# Patient Record
Sex: FEMALE | Race: WHITE | NOT HISPANIC OR LATINO | Employment: UNEMPLOYED | URBAN - METROPOLITAN AREA
[De-identification: names, ages, dates, MRNs, and addresses within clinical notes are randomized per-mention and may not be internally consistent; named-entity substitution may affect disease eponyms.]

---

## 2024-01-29 NOTE — PROGRESS NOTES
Chief Complaint  Chief Complaint   Patient presents with    Establish Care     Sees psychiatrist at [facility]     Nail Problem     Pt stated all 10 toenails need trimmed ; ref to podiatrist, prefers them to be numbed         Subjective          Chelsea Abdalla is here today to establish care. The following problems were discussed:     Family history: Mother- lung cancer, passed away/grandmother- heart disease/ Aunt- breast cancer/ maternal grandfather- alzheimer's     Social history: Smokes 1/2 PPD. Wants help to quit. Denies drinking. Smokes marijuana at night.     Anxiety with depression- She sees a therapist- Dr. Sparks and sees psych. She is currently on medications. Denies SI or HI.     Tobacco abuse- Smokes 1/2 PPD. Talk with her psych doctor about wellbutrin.     Toe infection- Both feet, all 10 toes. She reports this started 6 months ago. She does not get pedicares. She can't clip her toenails due to this.       She is from this area   Previous PCP was Dr. Joyce   Marital status- not   Children- Yes  Works as Unemployed   Exercise- irregularly  Diet- Eating well-balanced meals          Review of Systems   Constitutional:  Negative for chills and fever.   HENT:  Negative for congestion.    Eyes: Negative.    Respiratory:  Negative for shortness of breath.    Cardiovascular:  Negative for chest pain.   Gastrointestinal:  Negative for abdominal pain, nausea and vomiting.   Genitourinary:  Negative for difficulty urinating.   Musculoskeletal:  Negative for myalgias.   Skin:         Toe nail thickened, painful - bilateral feet    Neurological:  Negative for dizziness, light-headedness and headache.   Psychiatric/Behavioral:  Positive for depressed mood. Negative for self-injury and suicidal ideas. The patient is nervous/anxious.         Objective   Vital Signs:   Vitals:    01/31/24 0955   BP: 108/77   Pulse: 100   Temp: 98.2 °F (36.8 °C)   SpO2: 99%      Estimated body mass index is 20.5 kg/m² as  "calculated from the following:    Height as of this encounter: 167.6 cm (66\").    Weight as of this encounter: 57.6 kg (127 lb).    BMI is within normal parameters. No other follow-up for BMI required.            Patient screened positive for depression based on a PHQ-9 score of  10 . Follow-up recommendations include: Sees psychiatrist         Physical Exam  Vitals reviewed.   Constitutional:       Appearance: Normal appearance.   HENT:      Head: Normocephalic and atraumatic.      Nose: Nose normal.      Mouth/Throat:      Mouth: Mucous membranes are moist.      Pharynx: Oropharynx is clear.   Eyes:      Extraocular Movements: Extraocular movements intact.      Pupils: Pupils are equal, round, and reactive to light.   Cardiovascular:      Rate and Rhythm: Normal rate and regular rhythm.      Pulses: Normal pulses.      Heart sounds: Normal heart sounds.      Comments: S1, S2 audible  Pulmonary:      Effort: Pulmonary effort is normal.      Breath sounds: Normal breath sounds.      Comments: On room air   Abdominal:      General: Abdomen is flat.      Palpations: Abdomen is soft.   Musculoskeletal:         General: Normal range of motion.      Cervical back: Normal range of motion.   Feet:      Right foot:      Skin integrity: Skin integrity normal.      Toenail Condition: Right toenails are abnormally thick and long. Fungal disease present.     Left foot:      Skin integrity: Skin integrity normal.      Toenail Condition: Left toenails are abnormally thick and long. Fungal disease present.  Skin:     General: Skin is warm and dry.   Neurological:      General: No focal deficit present.      Mental Status: She is alert and oriented to person, place, and time. Mental status is at baseline.   Psychiatric:         Mood and Affect: Mood normal.         Behavior: Behavior normal.         Thought Content: Thought content normal.         Judgment: Judgment normal.                Physical Exam   Result Review :           "   Procedures       Assessment and Plan     Diagnoses and all orders for this visit:    1. Fungal nail infection (Primary)  Assessment & Plan:  Toe infection- Both feet, all 10 toes. She reports this started 6 months ago. She does not get pedicares. She can't clip her toenails due to this.     Fungal infection noted bilateral toes on all toes     Referral to podiatry     Orders:  -     Ambulatory Referral to Podiatry    2. Encounter to establish care  Assessment & Plan:  She is from this area   Previous PCP was Dr. Joyce   Marital status- not   Children- Yes  Works as Unemployed   Exercise- irregularly  Diet- Eating well-balanced meals     Family history: Mother- lung cancer, passed away/grandmother- heart disease/ Aunt- breast cancer/ maternal grandfather- alzheimer's     Social history: Smokes 1/2 PPD. Wants help to quit. Denies drinking. Smokes marijuana at night.       3. Tobacco abuse  Assessment & Plan:  Tobacco abuse- Smokes 1/2 PPD. Talk with her psych doctor about wellbutrin.     Chelsea BERMEO Rm  reports that she has been smoking cigarettes. She has a 5.00 pack-year smoking history. She has never been exposed to tobacco smoke. She has never used smokeless tobacco.. I have educated her on the risk of diseases from using tobacco products such as cancer, COPD, and heart disease.     I advised her to quit and she is willing to quit. We have discussed the following method/s for tobacco cessation:  Education Material.  Together we have set a quit date for 3 weeks from today.  She will follow up with me in 3 week or sooner to check on her progress.    I spent 3  minutes counseling the patient.            4. Anxiety with depression  Assessment & Plan:  Anxiety with depression- She sees a therapist- Dr. Sparks and sees psych. She is currently on medications. Denies SI or HI.     On remeron and fanapt                 Follow Up   Return in about 3 months (around 4/30/2024) for Annual physical.   Patient  was given instructions and counseling regarding her condition or for health maintenance advice. Please see specific information pulled into the AVS if appropriate.

## 2024-01-30 RX ORDER — MIRTAZAPINE 45 MG/1
TABLET, FILM COATED ORAL
COMMUNITY
Start: 2022-09-06

## 2024-01-30 RX ORDER — ILOPERIDONE 1-2-4-6MG
KIT ORAL
COMMUNITY
Start: 2022-09-06

## 2024-01-31 ENCOUNTER — OFFICE VISIT (OUTPATIENT)
Dept: FAMILY MEDICINE CLINIC | Facility: CLINIC | Age: 44
End: 2024-01-31
Payer: MEDICAID

## 2024-01-31 VITALS
WEIGHT: 127 LBS | DIASTOLIC BLOOD PRESSURE: 77 MMHG | HEART RATE: 100 BPM | TEMPERATURE: 98.2 F | SYSTOLIC BLOOD PRESSURE: 108 MMHG | HEIGHT: 66 IN | OXYGEN SATURATION: 99 % | BODY MASS INDEX: 20.41 KG/M2

## 2024-01-31 DIAGNOSIS — Z76.89 ENCOUNTER TO ESTABLISH CARE: ICD-10-CM

## 2024-01-31 DIAGNOSIS — B35.1 FUNGAL NAIL INFECTION: Primary | ICD-10-CM

## 2024-01-31 DIAGNOSIS — F41.8 ANXIETY WITH DEPRESSION: ICD-10-CM

## 2024-01-31 DIAGNOSIS — Z72.0 TOBACCO ABUSE: ICD-10-CM

## 2024-01-31 PROCEDURE — 99204 OFFICE O/P NEW MOD 45 MIN: CPT | Performed by: NURSE PRACTITIONER

## 2024-01-31 PROCEDURE — 1159F MED LIST DOCD IN RCRD: CPT | Performed by: NURSE PRACTITIONER

## 2024-01-31 PROCEDURE — 1160F RVW MEDS BY RX/DR IN RCRD: CPT | Performed by: NURSE PRACTITIONER

## 2024-01-31 NOTE — ASSESSMENT & PLAN NOTE
Anxiety with depression- She sees a therapist- Dr. Sparks and sees psych. She is currently on medications. Denies SI or HI.     On remeron and fanapt

## 2024-01-31 NOTE — ASSESSMENT & PLAN NOTE
She is from this area   Previous PCP was Dr. Joyce   Marital status- not   Children- Yes  Works as Unemployed   Exercise- irregularly  Diet- Eating well-balanced meals     Family history: Mother- lung cancer, passed away/grandmother- heart disease/ Aunt- breast cancer/ maternal grandfather- alzheimer's     Social history: Smokes 1/2 PPD. Wants help to quit. Denies drinking. Smokes marijuana at night.

## 2024-01-31 NOTE — ASSESSMENT & PLAN NOTE
Tobacco abuse- Smokes 1/2 PPD. Talk with her psych doctor about wellbutrin.     Chelsea Abdalla  reports that she has been smoking cigarettes. She has a 5.00 pack-year smoking history. She has never been exposed to tobacco smoke. She has never used smokeless tobacco.. I have educated her on the risk of diseases from using tobacco products such as cancer, COPD, and heart disease.     I advised her to quit and she is willing to quit. We have discussed the following method/s for tobacco cessation:  Education Material.  Together we have set a quit date for 3 weeks from today.  She will follow up with me in 3 week or sooner to check on her progress.    I spent 3  minutes counseling the patient.

## 2024-02-02 ENCOUNTER — PATIENT ROUNDING (BHMG ONLY) (OUTPATIENT)
Dept: FAMILY MEDICINE CLINIC | Facility: CLINIC | Age: 44
End: 2024-02-02
Payer: MEDICAID

## 2024-02-09 ENCOUNTER — TELEPHONE (OUTPATIENT)
Dept: FAMILY MEDICINE CLINIC | Facility: CLINIC | Age: 44
End: 2024-02-09

## 2024-02-09 DIAGNOSIS — B35.1 FUNGAL NAIL INFECTION: Primary | ICD-10-CM

## 2024-02-09 NOTE — TELEPHONE ENCOUNTER
Caller: Chelsea Abdalla    Relationship: Self    Best call back number: [phone]    What is the medical concern/diagnosis: PODIATRIST    What specialty or service is being requested: PODIATRIST    What is the provider, practice or medical service name: ANY     What is the office location:     What is the office phone number:     Any additional details: DR HEADLEY CANNOT GET AN APPT TIL 6/24

## 2024-02-12 DIAGNOSIS — B35.1 FUNGAL NAIL INFECTION: Primary | ICD-10-CM

## 2024-02-12 NOTE — TELEPHONE ENCOUNTER
Caller: Chelsea Abdalla    Relationship: Self    Best call back number: [phone]    What orders are you requesting (i.e. lab or imaging): PODIATRIST    In what timeframe would the patient need to come in: AS SOON AS POSSIBLE    Where will you receive your lab/imaging services: WHOEVER TAKES HER INSURANCE AND CAN GET HER IN THIS WEEK IF POSSIBLE    Additional notes: PATIENT STATED DR HEADLEY CANCELLED HER APPT AND CAN'T GET HER IN UNTIL JUNE.    DR NORWOOD OFFICE DOES NOT ACCEPT HER INSURANCE.    PATIENT REQUESTING TO GET INTO SEE A DR AS SOON AS POSSIBLE.

## 2024-04-26 RX ORDER — BUPROPION HYDROCHLORIDE 150 MG/1
150 TABLET, EXTENDED RELEASE ORAL DAILY
COMMUNITY
Start: 2024-04-03

## 2024-04-26 NOTE — PROGRESS NOTES
"Chief Complaint  Chief Complaint   Patient presents with    Annual Exam        Subjective          Chelsea Abdalla is a 44-year-old female who reports to the office today for annual physical.    Annual physical- Denies any new family history. Smokes 1/2 PPD. She is on wellbutrin. Denies drinking, Smokes marijuana nightly.     Anxiety with depression- She reports she has been on edge lately. She is on wellbutrin and remeron. She sees her counselor soon. Denies SI or HI.     Tobacco abuse-Smokes 1/2 PPD. She is on wellbutrin.     Fungal infection- She went to podiatry and they cut both her toenails off and gave her antibiotics.       Labs-Due  Mammogram- Due   Papsmear-Due      Vaccines:  UTD     Dental exam- Due  Eye exam-Due          Review of Systems   Constitutional:  Negative for chills and fever.   HENT:  Negative for congestion.    Eyes: Negative.    Respiratory:  Negative for shortness of breath.    Cardiovascular:  Negative for chest pain.   Gastrointestinal:  Negative for abdominal pain, nausea and vomiting.   Endocrine: Negative for polydipsia and polyuria.   Genitourinary:  Negative for difficulty urinating.   Musculoskeletal:  Negative for myalgias.   Skin: Negative.    Neurological:  Negative for dizziness, light-headedness and headache.   Psychiatric/Behavioral:  Positive for depressed mood. Negative for self-injury and suicidal ideas. The patient is nervous/anxious.         Objective   Vital Signs:   Vitals:    04/29/24 1457   BP: 110/77   Pulse: 115   Temp: 98.4 °F (36.9 °C)   SpO2: 100%      Estimated body mass index is 20.99 kg/m² as calculated from the following:    Height as of this encounter: 167.6 cm (65.98\").    Weight as of this encounter: 59 kg (130 lb).    BMI is within normal parameters. No other follow-up for BMI required.            Patient screened positive for depression based on a PHQ-9 score of 10 on 1/31/2024. Follow-up recommendations include: PCP managing " depression.        Physical Exam  Vitals reviewed.   Constitutional:       Appearance: Normal appearance. She is normal weight.   HENT:      Head: Normocephalic and atraumatic.      Right Ear: Tympanic membrane, ear canal and external ear normal.      Left Ear: Tympanic membrane, ear canal and external ear normal.      Nose: Nose normal.      Mouth/Throat:      Mouth: Mucous membranes are moist.      Pharynx: Oropharynx is clear.   Eyes:      Extraocular Movements: Extraocular movements intact.      Conjunctiva/sclera: Conjunctivae normal.      Pupils: Pupils are equal, round, and reactive to light.   Cardiovascular:      Rate and Rhythm: Normal rate and regular rhythm.      Pulses: Normal pulses.      Heart sounds: Normal heart sounds.      Comments: S1, S2 audible  Pulmonary:      Effort: Pulmonary effort is normal.      Breath sounds: Normal breath sounds.      Comments: On room air   Abdominal:      General: Abdomen is flat. Bowel sounds are normal.      Palpations: Abdomen is soft.   Musculoskeletal:         General: Normal range of motion.      Cervical back: Normal range of motion and neck supple.   Feet:      Right foot:      Toenail Condition: Right toenails are abnormally thick. Fungal disease present.     Left foot:      Toenail Condition: Left toenails are abnormally thick. Fungal disease present.  Skin:     General: Skin is warm and dry.      Comments: Scars noted on face, tan skin noted   Neurological:      General: No focal deficit present.      Mental Status: She is alert and oriented to person, place, and time. Mental status is at baseline.   Psychiatric:         Mood and Affect: Mood normal.         Behavior: Behavior normal.         Thought Content: Thought content normal.         Judgment: Judgment normal.                Physical Exam   Result Review :             Procedures       Assessment and Plan     Diagnoses and all orders for this visit:    1. Encounter for annual physical exam  (Primary)  Assessment & Plan:  Annual physical- Denies any new family history. Smokes 1/2 PPD. She is on wellbutrin. Denies drinking, Smokes marijuana nightly.     Labs-Due  Mammogram- Due   Papsmear-Due      Vaccines:  UTD     Dental exam- Due  Eye exam-Due     Encourage healthy diet and exercise  Encourage monthly self breast exams   Check labs   Mammogram ordered   Encouraged to get papsmear     Orders:  -     Comprehensive Metabolic Panel; Future  -     Hemoglobin A1c; Future  -     Lipid Panel; Future  -     Hepatitis C Antibody; Future    2. Anxiety with depression  Assessment & Plan:  On remeron and wellbutrin    Anxiety with depression- She reports she has been on edge lately. She is on wellbutrin and remeron. She sees her counselor soon. Denies SI or HI.       3. Tobacco abuse  Assessment & Plan:  Tobacco abuse-Smokes 1/2 PPD. She is on wellbutrin.     Chelsea Abdalla  reports that she has been smoking cigarettes. She started smoking about 14 years ago. She has a 12 pack-year smoking history. She has never been exposed to tobacco smoke. She has never used smokeless tobacco. I have educated her on the risk of diseases from using tobacco products such as cancer, COPD, and heart disease.     I advised her to quit and she is willing to quit. We have discussed the following method/s for tobacco cessation:  Prescription Medicaiton.  Together we have set a quit date for 3 weeks from today.  She will follow up with me in 3 week or sooner to check on her progress.    I spent 3  minutes counseling the patient.            4. Fungal nail infection  Assessment & Plan:  Fungal infection- She went to podiatry and they cut both her toenails off and gave her antibiotics.     On lamisil       5. Encounter for screening mammogram for malignant neoplasm of breast  -     Mammo Screening Digital Tomosynthesis Bilateral With CAD; Future              Follow Up   Return in about 1 year (around 4/29/2025) for Annual physical.    Patient was given instructions and counseling regarding her condition or for health maintenance advice. Please see specific information pulled into the AVS if appropriate.

## 2024-04-29 ENCOUNTER — OFFICE VISIT (OUTPATIENT)
Dept: FAMILY MEDICINE CLINIC | Facility: CLINIC | Age: 44
End: 2024-04-29
Payer: MEDICAID

## 2024-04-29 VITALS
BODY MASS INDEX: 20.89 KG/M2 | HEART RATE: 115 BPM | WEIGHT: 130 LBS | OXYGEN SATURATION: 100 % | SYSTOLIC BLOOD PRESSURE: 110 MMHG | DIASTOLIC BLOOD PRESSURE: 77 MMHG | TEMPERATURE: 98.4 F | HEIGHT: 66 IN

## 2024-04-29 DIAGNOSIS — F41.8 ANXIETY WITH DEPRESSION: ICD-10-CM

## 2024-04-29 DIAGNOSIS — B35.1 FUNGAL NAIL INFECTION: ICD-10-CM

## 2024-04-29 DIAGNOSIS — Z72.0 TOBACCO ABUSE: ICD-10-CM

## 2024-04-29 DIAGNOSIS — Z12.31 ENCOUNTER FOR SCREENING MAMMOGRAM FOR MALIGNANT NEOPLASM OF BREAST: ICD-10-CM

## 2024-04-29 DIAGNOSIS — Z00.00 ENCOUNTER FOR ANNUAL PHYSICAL EXAM: Primary | ICD-10-CM

## 2024-04-29 PROBLEM — Z76.89 ENCOUNTER TO ESTABLISH CARE: Status: RESOLVED | Noted: 2024-01-31 | Resolved: 2024-04-29

## 2024-04-29 PROCEDURE — 2014F MENTAL STATUS ASSESS: CPT | Performed by: NURSE PRACTITIONER

## 2024-04-29 PROCEDURE — 1159F MED LIST DOCD IN RCRD: CPT | Performed by: NURSE PRACTITIONER

## 2024-04-29 PROCEDURE — 99396 PREV VISIT EST AGE 40-64: CPT | Performed by: NURSE PRACTITIONER

## 2024-04-29 PROCEDURE — 1160F RVW MEDS BY RX/DR IN RCRD: CPT | Performed by: NURSE PRACTITIONER

## 2024-04-29 PROCEDURE — 99406 BEHAV CHNG SMOKING 3-10 MIN: CPT | Performed by: NURSE PRACTITIONER

## 2024-04-29 RX ORDER — TERBINAFINE HYDROCHLORIDE 250 MG/1
250 TABLET ORAL DAILY
COMMUNITY

## 2024-04-29 NOTE — ASSESSMENT & PLAN NOTE
On remeron and wellbutrin    Anxiety with depression- She reports she has been on edge lately. She is on wellbutrin and remeron. She sees her counselor soon. Denies SI or HI.

## 2024-04-29 NOTE — ASSESSMENT & PLAN NOTE
Fungal infection- She went to podiatry and they cut both her toenails off and gave her antibiotics.     On lamisil

## 2024-04-29 NOTE — PATIENT INSTRUCTIONS
Encourage healthy diet and exercise  Encourage monthly self breast exams   Check labs   Mammogram ordered   Encouraged to get papsmear

## 2024-04-29 NOTE — ASSESSMENT & PLAN NOTE
Annual physical- Denies any new family history. Smokes 1/2 PPD. She is on wellbutrin. Denies drinking, Smokes marijuana nightly.     Labs-Due  Mammogram- Due   Papsmear-Due      Vaccines:  UTD     Dental exam- Due  Eye exam-Due     Encourage healthy diet and exercise  Encourage monthly self breast exams   Check labs   Mammogram ordered   Encouraged to get papsmear

## 2024-05-06 ENCOUNTER — CLINICAL SUPPORT (OUTPATIENT)
Dept: FAMILY MEDICINE CLINIC | Facility: CLINIC | Age: 44
End: 2024-05-06
Payer: MEDICAID

## 2024-05-06 DIAGNOSIS — Z00.00 ENCOUNTER FOR ANNUAL PHYSICAL EXAM: ICD-10-CM

## 2024-05-06 PROCEDURE — 86803 HEPATITIS C AB TEST: CPT | Performed by: NURSE PRACTITIONER

## 2024-05-06 PROCEDURE — 80053 COMPREHEN METABOLIC PANEL: CPT | Performed by: NURSE PRACTITIONER

## 2024-05-06 PROCEDURE — 80061 LIPID PANEL: CPT | Performed by: NURSE PRACTITIONER

## 2024-05-06 PROCEDURE — 83036 HEMOGLOBIN GLYCOSYLATED A1C: CPT | Performed by: NURSE PRACTITIONER

## 2024-05-06 PROCEDURE — 36415 COLL VENOUS BLD VENIPUNCTURE: CPT | Performed by: NURSE PRACTITIONER

## 2024-05-07 LAB
ALBUMIN SERPL-MCNC: 4.2 G/DL (ref 3.5–5.2)
ALBUMIN/GLOB SERPL: 1.4 G/DL
ALP SERPL-CCNC: 74 U/L (ref 39–117)
ALT SERPL W P-5'-P-CCNC: 14 U/L (ref 1–33)
ANION GAP SERPL CALCULATED.3IONS-SCNC: 12.3 MMOL/L (ref 5–15)
AST SERPL-CCNC: 17 U/L (ref 1–32)
BILIRUB SERPL-MCNC: <0.2 MG/DL (ref 0–1.2)
BUN SERPL-MCNC: 9 MG/DL (ref 6–20)
BUN/CREAT SERPL: 10.1 (ref 7–25)
CALCIUM SPEC-SCNC: 9.5 MG/DL (ref 8.6–10.5)
CHLORIDE SERPL-SCNC: 108 MMOL/L (ref 98–107)
CHOLEST SERPL-MCNC: 178 MG/DL (ref 0–200)
CO2 SERPL-SCNC: 20.7 MMOL/L (ref 22–29)
CREAT SERPL-MCNC: 0.89 MG/DL (ref 0.57–1)
EGFRCR SERPLBLD CKD-EPI 2021: 82.6 ML/MIN/1.73
GLOBULIN UR ELPH-MCNC: 2.9 GM/DL
GLUCOSE SERPL-MCNC: 90 MG/DL (ref 65–99)
HBA1C MFR BLD: 5.2 % (ref 4.8–5.6)
HCV AB SER QL: NORMAL
HDLC SERPL-MCNC: 38 MG/DL (ref 40–60)
LDLC SERPL CALC-MCNC: 125 MG/DL (ref 0–100)
LDLC/HDLC SERPL: 3.26 {RATIO}
POTASSIUM SERPL-SCNC: 4.7 MMOL/L (ref 3.5–5.2)
PROT SERPL-MCNC: 7.1 G/DL (ref 6–8.5)
SODIUM SERPL-SCNC: 141 MMOL/L (ref 136–145)
TRIGL SERPL-MCNC: 80 MG/DL (ref 0–150)
VLDLC SERPL-MCNC: 15 MG/DL (ref 5–40)

## 2024-05-15 ENCOUNTER — HOSPITAL ENCOUNTER (OUTPATIENT)
Dept: MAMMOGRAPHY | Facility: HOSPITAL | Age: 44
Discharge: HOME | End: 2024-05-15
Admitting: NURSE PRACTITIONER
Payer: MEDICAID

## 2024-05-15 ENCOUNTER — TELEPHONE (OUTPATIENT)
Dept: FAMILY MEDICINE CLINIC | Facility: CLINIC | Age: 44
End: 2024-05-15
Payer: MEDICAID

## 2024-05-15 DIAGNOSIS — R92.8 ABNORMAL MAMMOGRAM OF BOTH BREASTS: Primary | ICD-10-CM

## 2024-05-15 DIAGNOSIS — Z12.31 ENCOUNTER FOR SCREENING MAMMOGRAM FOR MALIGNANT NEOPLASM OF BREAST: ICD-10-CM

## 2024-05-15 DIAGNOSIS — Z80.3 FAMILY HISTORY OF BREAST CANCER: ICD-10-CM

## 2024-05-15 PROCEDURE — 77063 BREAST TOMOSYNTHESIS BI: CPT

## 2024-05-15 PROCEDURE — 77067 SCR MAMMO BI INCL CAD: CPT

## 2024-05-15 NOTE — TELEPHONE ENCOUNTER
I called patient on 5/15/2024 in regards to her mammogram of her breast.  It did show she has breast dense tissue and some masses bilateral breast that could be cyst or lymph nodes.  It is recommended to do a risk analysis.  Patient does have a family history of breast cancer.  MRI of bilateral breast were ordered.    Electronically signed by RUPA Butcher, 05/15/24, 10:16 AM EDT.

## 2024-06-05 ENCOUNTER — HOSPITAL ENCOUNTER (OUTPATIENT)
Dept: MRI IMAGING | Facility: HOSPITAL | Age: 44
Discharge: HOME | End: 2024-06-05
Payer: MEDICAID

## 2024-06-05 DIAGNOSIS — R92.8 ABNORMAL MAMMOGRAM OF BOTH BREASTS: ICD-10-CM

## 2024-06-05 DIAGNOSIS — Z80.3 FAMILY HISTORY OF BREAST CANCER: ICD-10-CM

## 2024-06-05 LAB
CREAT BLDA-MCNC: 0.8 MG/DL (ref 0.6–1.3)
EGFRCR SERPLBLD CKD-EPI 2021: 93.9 ML/MIN/1.73

## 2024-06-05 PROCEDURE — 25010000002 GADOTERIDOL PER 1 ML: Performed by: NURSE PRACTITIONER

## 2024-06-05 PROCEDURE — 77049 MRI BREAST C-+ W/CAD BI: CPT

## 2024-06-05 PROCEDURE — 82565 ASSAY OF CREATININE: CPT

## 2024-06-05 PROCEDURE — A9579 GAD-BASE MR CONTRAST NOS,1ML: HCPCS | Performed by: NURSE PRACTITIONER

## 2024-06-05 RX ADMIN — GADOTERIDOL 12 ML: 279.3 INJECTION, SOLUTION INTRAVENOUS at 14:08

## 2024-06-07 ENCOUNTER — TELEPHONE (OUTPATIENT)
Dept: FAMILY MEDICINE CLINIC | Facility: CLINIC | Age: 44
End: 2024-06-07
Payer: MEDICAID

## 2024-06-07 DIAGNOSIS — R92.8 ABNORMAL MAMMOGRAM OF BOTH BREASTS: Primary | ICD-10-CM

## 2024-06-07 NOTE — TELEPHONE ENCOUNTER
So MRI now, and repeat in 6 mon? Just wanted to verify as there are 2 separate orders for same MRI.

## 2024-06-07 NOTE — TELEPHONE ENCOUNTER
I called patient on 6/7/2024 in regards to her diagnostic mammogram.  Patient understood the results.  I ordered an MRI to be done in 6 months to follow-up on this.    Electronically signed by RUPA Butcher, 06/07/24, 10:22 AM EDT.

## 2024-12-16 ENCOUNTER — HOSPITAL ENCOUNTER (OUTPATIENT)
Dept: MRI IMAGING | Facility: HOSPITAL | Age: 44
Discharge: HOME | End: 2024-12-16
Admitting: NURSE PRACTITIONER
Payer: MEDICAID

## 2024-12-16 DIAGNOSIS — R92.8 ABNORMAL MAMMOGRAM OF BOTH BREASTS: ICD-10-CM

## 2024-12-16 PROCEDURE — 77049 MRI BREAST C-+ W/CAD BI: CPT

## 2024-12-16 PROCEDURE — A9579 GAD-BASE MR CONTRAST NOS,1ML: HCPCS | Performed by: NURSE PRACTITIONER

## 2024-12-16 PROCEDURE — 25010000002 GADOTERIDOL PER 1 ML: Performed by: NURSE PRACTITIONER

## 2024-12-16 RX ADMIN — GADOTERIDOL 12 ML: 279.3 INJECTION, SOLUTION INTRAVENOUS at 10:42

## 2025-04-28 NOTE — PROGRESS NOTES
"Chief Complaint  Annual physical        Subjective          Chelsea Abdalla is a 45-year-old female who reports to the office today for annual physical.     Annual physical- Denies any new family history. Smokes 1/2 PPD, does not want help to quit. Denies drinking, Smokes marijuana nightly.      Anxiety with depression- She reports this has been good. She is sees a therapist. Denies SI or HI.         Labs-Due  Mammogram-UTD  Papsmear-UTD       Vaccines:  PNA-Refused       Dental exam-Has dentures   Eye exam-UTD          Review of Systems   Constitutional:  Negative for chills and fatigue.   HENT:  Negative for congestion.    Eyes: Negative.    Respiratory:  Negative for shortness of breath.    Cardiovascular:  Negative for chest pain.   Gastrointestinal:  Negative for abdominal pain, nausea and vomiting.   Genitourinary:  Positive for pelvic pain. Negative for difficulty urinating.   Musculoskeletal:  Negative for myalgias.   Skin: Negative.    Neurological:  Negative for dizziness, light-headedness and headache.   Psychiatric/Behavioral:  Negative for self-injury, suicidal ideas and depressed mood. The patient is not nervous/anxious.         Objective   Vital Signs:   Vitals:    05/02/25 0925   BP: 114/75   Pulse: 89   Temp: 97.8 °F (36.6 °C)   SpO2: 98%      Estimated body mass index is 21.53 kg/m² as calculated from the following:    Height as of this encounter: 167.6 cm (66\").    Weight as of this encounter: 60.5 kg (133 lb 6.4 oz).    BMI is within normal parameters. No other follow-up for BMI required.            Patient screened negative for depression based on a PHQ-9 score of 0 on 5/2/2025 . Follow-up recommendations include: PCP managing depression.        Physical Exam  Vitals reviewed.   Constitutional:       Appearance: Normal appearance. She is normal weight.   HENT:      Head: Normocephalic and atraumatic.      Right Ear: Tympanic membrane, ear canal and external ear normal.      Left Ear: Tympanic " membrane, ear canal and external ear normal.      Nose: Nose normal.      Mouth/Throat:      Mouth: Mucous membranes are moist.      Pharynx: Oropharynx is clear.   Eyes:      Extraocular Movements: Extraocular movements intact.      Conjunctiva/sclera: Conjunctivae normal.      Pupils: Pupils are equal, round, and reactive to light.   Cardiovascular:      Rate and Rhythm: Normal rate and regular rhythm.      Pulses: Normal pulses.      Heart sounds: Normal heart sounds.      Comments: S1, S2 audible  Pulmonary:      Effort: Pulmonary effort is normal.      Breath sounds: Normal breath sounds.      Comments: On room air   Abdominal:      General: Abdomen is flat. Bowel sounds are normal.      Palpations: Abdomen is soft.   Musculoskeletal:         General: Normal range of motion.      Cervical back: Normal range of motion and neck supple.   Skin:     General: Skin is warm and dry.   Neurological:      General: No focal deficit present.      Mental Status: She is alert and oriented to person, place, and time. Mental status is at baseline.   Psychiatric:         Mood and Affect: Mood normal.         Behavior: Behavior normal.         Thought Content: Thought content normal.         Judgment: Judgment normal.                Physical Exam   Result Review :             Procedures       Assessment and Plan     Diagnoses and all orders for this visit:    1. Encounter for annual physical exam (Primary)  Assessment & Plan:  Annual physical- Denies any new family history. Smokes 1/2 PPD, does not want help to quit. Denies drinking, Smokes marijuana nightly.     Labs-Due  Mammogram-UTD  Papsmear-UTD       Vaccines:  PNA-Refused       Dental exam-Has dentures   Eye exam-UTD     Encourage healthy diet and exercise  Encouraged monthly self breast exams  Check labs  Encourage papsmear   Encourage smoking cessation     Orders:  -     Comprehensive Metabolic Panel; Future  -     Hemoglobin A1c; Future  -     Lipid Panel;  Future    2. Anxiety with depression  Assessment & Plan:    Anxiety with depression- She reports this has been good. She is sees a therapist. Denies SI or HI.     On fanapt and remeron      3. Tobacco abuse  Assessment & Plan:  Chelsea Abdalla  reports that she has been smoking cigarettes. She started smoking about 15 years ago. She has a 7.5 pack-year smoking history. She has never been exposed to tobacco smoke. She has never used smokeless tobacco. I have educated her on the risk of diseases from using tobacco products such as cancer, COPD, and heart disease.     I advised her to quit and she is not willing to quit.    I spent 3  minutes counseling the patient.                      Follow Up   Return in about 1 year (around 5/2/2026) for Annual physical.   Patient was given instructions and counseling regarding her condition or for health maintenance advice. Please see specific information pulled into the AVS if appropriate.

## 2025-04-30 NOTE — ASSESSMENT & PLAN NOTE
Anxiety with depression- She reports this has been good. She is sees a therapist. Denies SI or HI.     On fanapt and remeron

## 2025-04-30 NOTE — ASSESSMENT & PLAN NOTE
Annual physical- Denies any new family history. Smokes 1/2 PPD, does not want help to quit. Denies drinking, Smokes marijuana nightly.     Labs-Due  Mammogram-UTD  Papsmear-UTD       Vaccines:  PNA-Refused       Dental exam-Has dentures   Eye exam-UTD     Encourage healthy diet and exercise  Encouraged monthly self breast exams  Check labs  Encourage papsmear   Encourage smoking cessation

## 2025-05-02 ENCOUNTER — OFFICE VISIT (OUTPATIENT)
Dept: FAMILY MEDICINE CLINIC | Facility: CLINIC | Age: 45
End: 2025-05-02
Payer: MEDICAID

## 2025-05-02 VITALS
SYSTOLIC BLOOD PRESSURE: 114 MMHG | HEART RATE: 89 BPM | WEIGHT: 133.4 LBS | BODY MASS INDEX: 21.44 KG/M2 | TEMPERATURE: 97.8 F | HEIGHT: 66 IN | OXYGEN SATURATION: 98 % | DIASTOLIC BLOOD PRESSURE: 75 MMHG

## 2025-05-02 DIAGNOSIS — F41.8 ANXIETY WITH DEPRESSION: ICD-10-CM

## 2025-05-02 DIAGNOSIS — Z00.00 ENCOUNTER FOR ANNUAL PHYSICAL EXAM: Primary | ICD-10-CM

## 2025-05-02 DIAGNOSIS — Z72.0 TOBACCO ABUSE: ICD-10-CM

## 2025-05-02 PROBLEM — B35.1 FUNGAL NAIL INFECTION: Status: RESOLVED | Noted: 2024-01-31 | Resolved: 2025-05-02

## 2025-05-02 RX ORDER — ILOPERIDONE 6 MG/1
TABLET ORAL
COMMUNITY
Start: 2025-04-15

## 2025-05-02 NOTE — ASSESSMENT & PLAN NOTE
Chelsea BERMEO Rm  reports that she has been smoking cigarettes. She started smoking about 15 years ago. She has a 7.5 pack-year smoking history. She has never been exposed to tobacco smoke. She has never used smokeless tobacco. I have educated her on the risk of diseases from using tobacco products such as cancer, COPD, and heart disease.     I advised her to quit and she is not willing to quit.    I spent 3  minutes counseling the patient.

## 2025-05-02 NOTE — PATIENT INSTRUCTIONS
Encourage healthy diet and exercise  Encouraged monthly self breast exams  Check labs  Encourage papsmear   Encourage smoking cessation

## 2025-05-12 ENCOUNTER — CLINICAL SUPPORT (OUTPATIENT)
Dept: FAMILY MEDICINE CLINIC | Facility: CLINIC | Age: 45
End: 2025-05-12
Payer: MEDICAID

## 2025-05-12 DIAGNOSIS — Z00.00 ENCOUNTER FOR ANNUAL PHYSICAL EXAM: ICD-10-CM

## 2025-05-12 PROCEDURE — 83036 HEMOGLOBIN GLYCOSYLATED A1C: CPT | Performed by: NURSE PRACTITIONER

## 2025-05-12 PROCEDURE — 80061 LIPID PANEL: CPT | Performed by: NURSE PRACTITIONER

## 2025-05-12 PROCEDURE — 80053 COMPREHEN METABOLIC PANEL: CPT | Performed by: NURSE PRACTITIONER

## 2025-05-12 PROCEDURE — 36415 COLL VENOUS BLD VENIPUNCTURE: CPT | Performed by: NURSE PRACTITIONER

## 2025-05-12 NOTE — PROGRESS NOTES
Venipuncture performed in L ARM by RT Lo  with good hemostasis. Patient tolerated well. 05/12/25 Evelyn Sneed, RT

## 2025-05-13 LAB
ALBUMIN SERPL-MCNC: 4.3 G/DL (ref 3.5–5.2)
ALBUMIN/GLOB SERPL: 1.3 G/DL
ALP SERPL-CCNC: 78 U/L (ref 39–117)
ALT SERPL W P-5'-P-CCNC: 10 U/L (ref 1–33)
ANION GAP SERPL CALCULATED.3IONS-SCNC: 9.3 MMOL/L (ref 5–15)
AST SERPL-CCNC: 18 U/L (ref 1–32)
BILIRUB SERPL-MCNC: 0.3 MG/DL (ref 0–1.2)
BUN SERPL-MCNC: 9 MG/DL (ref 6–20)
BUN/CREAT SERPL: 10 (ref 7–25)
CALCIUM SPEC-SCNC: 9.3 MG/DL (ref 8.6–10.5)
CHLORIDE SERPL-SCNC: 104 MMOL/L (ref 98–107)
CHOLEST SERPL-MCNC: 181 MG/DL (ref 0–200)
CO2 SERPL-SCNC: 23.7 MMOL/L (ref 22–29)
CREAT SERPL-MCNC: 0.9 MG/DL (ref 0.57–1)
EGFRCR SERPLBLD CKD-EPI 2021: 81 ML/MIN/1.73
GLOBULIN UR ELPH-MCNC: 3.3 GM/DL
GLUCOSE SERPL-MCNC: 114 MG/DL (ref 65–99)
HBA1C MFR BLD: 5 % (ref 4.8–5.6)
HDLC SERPL-MCNC: 32 MG/DL (ref 40–60)
LDLC SERPL CALC-MCNC: 128 MG/DL (ref 0–100)
LDLC/HDLC SERPL: 3.93 {RATIO}
POTASSIUM SERPL-SCNC: 4.1 MMOL/L (ref 3.5–5.2)
PROT SERPL-MCNC: 7.6 G/DL (ref 6–8.5)
SODIUM SERPL-SCNC: 137 MMOL/L (ref 136–145)
TRIGL SERPL-MCNC: 116 MG/DL (ref 0–150)
VLDLC SERPL-MCNC: 21 MG/DL (ref 5–40)

## 2025-06-04 ENCOUNTER — OFFICE VISIT (OUTPATIENT)
Dept: FAMILY MEDICINE CLINIC | Facility: CLINIC | Age: 45
End: 2025-06-04
Payer: MEDICAID

## 2025-06-04 VITALS
SYSTOLIC BLOOD PRESSURE: 106 MMHG | DIASTOLIC BLOOD PRESSURE: 70 MMHG | HEIGHT: 66 IN | OXYGEN SATURATION: 98 % | WEIGHT: 126 LBS | HEART RATE: 122 BPM | TEMPERATURE: 98 F | BODY MASS INDEX: 20.25 KG/M2

## 2025-06-04 DIAGNOSIS — R00.0 TACHYCARDIA: ICD-10-CM

## 2025-06-04 DIAGNOSIS — F41.8 ANXIETY WITH DEPRESSION: Primary | ICD-10-CM

## 2025-06-04 PROBLEM — B97.7 HPV IN FEMALE: Status: ACTIVE | Noted: 2025-06-04

## 2025-06-04 PROCEDURE — 1160F RVW MEDS BY RX/DR IN RCRD: CPT | Performed by: NURSE PRACTITIONER

## 2025-06-04 PROCEDURE — 99214 OFFICE O/P EST MOD 30 MIN: CPT | Performed by: NURSE PRACTITIONER

## 2025-06-04 PROCEDURE — 1159F MED LIST DOCD IN RCRD: CPT | Performed by: NURSE PRACTITIONER

## 2025-06-04 PROCEDURE — 1126F AMNT PAIN NOTED NONE PRSNT: CPT | Performed by: NURSE PRACTITIONER

## 2025-06-04 RX ORDER — ILOPERIDONE 6 MG/1
6 TABLET ORAL EVERY 12 HOURS SCHEDULED
Qty: 90 TABLET | Refills: 11 | Status: SHIPPED | OUTPATIENT
Start: 2025-06-04

## 2025-06-04 RX ORDER — MIRTAZAPINE 45 MG/1
45 TABLET, FILM COATED ORAL NIGHTLY
Qty: 90 TABLET | Refills: 3 | Status: SHIPPED | OUTPATIENT
Start: 2025-06-04

## 2025-06-04 RX ORDER — METOPROLOL TARTRATE 25 MG/1
12.5 TABLET, FILM COATED ORAL 2 TIMES DAILY
Qty: 60 TABLET | Refills: 0 | Status: SHIPPED | OUTPATIENT
Start: 2025-06-04

## 2025-06-04 NOTE — PROGRESS NOTES
"Chief Complaint  Chief Complaint   Patient presents with    Med Management        Subjective          Chelsea Abdalla is a 45-year-old female who reports to the office today for follow-up on anxiety with depression.    Anxiety depression-She is remeron and fanapt and this keeps this controlled. Denies SI or HI. She has seen therapy in the past. She previously went to [facility] and would like me to take over her medications. She reports it is seasonal depression as she notices this.            Review of Systems   Constitutional:  Negative for chills and fever.   HENT:  Negative for congestion.    Respiratory:  Negative for shortness of breath.    Cardiovascular:  Negative for chest pain.   Gastrointestinal:  Negative for abdominal pain, nausea and vomiting.   Genitourinary:  Negative for difficulty urinating.   Musculoskeletal:  Negative for myalgias.   Skin: Negative.    Neurological:  Negative for dizziness, light-headedness and headache.   Psychiatric/Behavioral:  Positive for depressed mood. Negative for self-injury and suicidal ideas. The patient is nervous/anxious.         Objective   Vital Signs:   Vitals:    06/04/25 1235   BP: 106/70   Pulse: (!) 122   Temp: 98 °F (36.7 °C)   SpO2: 98%      Estimated body mass index is 20.35 kg/m² as calculated from the following:    Height as of this encounter: 167.6 cm (65.98\").    Weight as of this encounter: 57.2 kg (126 lb).    BMI is within normal parameters. No other follow-up for BMI required.                    Physical Exam  Vitals reviewed.   Constitutional:       Appearance: Normal appearance. She is normal weight.   HENT:      Head: Normocephalic and atraumatic.      Nose: Nose normal.      Mouth/Throat:      Mouth: Mucous membranes are moist.      Pharynx: Oropharynx is clear.   Eyes:      Extraocular Movements: Extraocular movements intact.      Conjunctiva/sclera: Conjunctivae normal.   Cardiovascular:      Rate and Rhythm: Regular rhythm. Tachycardia " present.      Pulses: Normal pulses.      Heart sounds: Normal heart sounds.      Comments: S1, S2 audible  Pulmonary:      Effort: Pulmonary effort is normal.      Breath sounds: Normal breath sounds.      Comments: On room air   Abdominal:      General: Abdomen is flat.   Musculoskeletal:         General: Normal range of motion.      Cervical back: Normal range of motion.   Skin:     General: Skin is warm and dry.   Neurological:      General: No focal deficit present.      Mental Status: She is alert and oriented to person, place, and time. Mental status is at baseline.   Psychiatric:         Mood and Affect: Mood normal.         Behavior: Behavior normal.         Thought Content: Thought content normal.         Judgment: Judgment normal.                Physical Exam   Result Review :             Procedures       Assessment and Plan     Diagnoses and all orders for this visit:    1. Anxiety with depression (Primary)  Assessment & Plan:  Anxiety depression-She is remeron and fanapt and this keeps this controlled. Denies SI or HI. She has seen therapy in the past. She previously went to [facility] and would like me to take over her medications. She reports it is seasonal depression as she notices this.     Refilled fanapt and remeron  Encourage counseling as needed       2. Tachycardia  Assessment & Plan:  Patient denies CP, SOA, or heart palpitations    Could be secondary to fanapt- Patient asymptomatic    -122 in office  Prescribed metoprolol 12.5mg         Other orders  -     Fanapt 6 MG tablet; Take 1 tablet by mouth Every 12 (Twelve) Hours. Takes 6mg in the am and 12mg in the pm  Dispense: 90 tablet; Refill: 11  -     mirtazapine (REMERON) 45 MG tablet; Take 1 tablet by mouth Every Night.  Dispense: 90 tablet; Refill: 3  -     metoprolol tartrate (LOPRESSOR) 25 MG tablet; Take 0.5 tablets by mouth 2 (Two) Times a Day.  Dispense: 60 tablet; Refill: 0              Follow Up   Return in about 1 month  (around 7/4/2025) for tachycardia, Recheck.   Patient was given instructions and counseling regarding her condition or for health maintenance advice. Please see specific information pulled into the AVS if appropriate.

## 2025-06-04 NOTE — ASSESSMENT & PLAN NOTE
Patient denies CP, SOA, or heart palpitations    Could be secondary to fanapt- Patient asymptomatic    -122 in office  Prescribed metoprolol 12.5mg

## 2025-06-04 NOTE — PATIENT INSTRUCTIONS
Refilled fanapt and remeron  Encourage counseling as needed   Prescribed metoprolol 12.5mg twice a day

## 2025-06-04 NOTE — ASSESSMENT & PLAN NOTE
Anxiety depression-She is remeron and fanapt and this keeps this controlled. Denies SI or HI. She has seen therapy in the past. She previously went to [facility] and would like me to take over her medications. She reports it is seasonal depression as she notices this.     Refilled fanapt and remeron  Encourage counseling as needed

## 2025-06-05 ENCOUNTER — PRIOR AUTHORIZATION (OUTPATIENT)
Dept: FAMILY MEDICINE CLINIC | Facility: CLINIC | Age: 45
End: 2025-06-05
Payer: MEDICAID

## 2025-06-05 NOTE — TELEPHONE ENCOUNTER
PA SUBMITTED FOR Fanapt 6MG tablets    WAITING OUTCOME FROM INSURANCE    (Ball: [id number])  PA Case ID #: [id number]  Rx #: [id number]

## 2025-07-07 NOTE — PROGRESS NOTES
"Chief Complaint  Chief Complaint   Patient presents with    Rapid Heart Rate     1 mo f/u         Subjective          Chelsea Abdalla is a 45-year-old female who reports to the office today for follow-up on tachycardia.    Tachycardia- She has heart palpitations when her anxiety is high. Denies CP or SOA. She has not checked her HR at home. She stopped the fanapt.        Anxiety- She reports she does have bad anxiety. Denies SI or HI. She does not see a counselor.               Review of Systems   Constitutional:  Negative for chills and fever.   HENT:  Negative for congestion.    Respiratory:  Negative for shortness of breath.    Cardiovascular:  Positive for palpitations. Negative for chest pain.   Gastrointestinal:  Negative for abdominal pain, nausea and vomiting.   Genitourinary:  Negative for difficulty urinating.   Musculoskeletal:  Negative for myalgias.   Skin: Negative.    Neurological:  Negative for dizziness, light-headedness and headache.   Psychiatric/Behavioral:  Positive for depressed mood. Negative for self-injury and suicidal ideas. The patient is nervous/anxious.         Objective   Vital Signs:   Vitals:    07/08/25 1314   BP: 106/73   Pulse: (!) 124   Temp: 98.4 °F (36.9 °C)   SpO2: 98%      Estimated body mass index is 20.67 kg/m² as calculated from the following:    Height as of this encounter: 167.6 cm (65.98\").    Weight as of this encounter: 58.1 kg (128 lb).    BMI is within normal parameters. No other follow-up for BMI required.                    Physical Exam  Vitals reviewed.   Constitutional:       Appearance: Normal appearance. She is normal weight.   HENT:      Head: Normocephalic and atraumatic.      Nose: Nose normal.      Mouth/Throat:      Mouth: Mucous membranes are moist.      Pharynx: Oropharynx is clear.   Eyes:      Extraocular Movements: Extraocular movements intact.      Conjunctiva/sclera: Conjunctivae normal.   Cardiovascular:      Rate and Rhythm: Regular rhythm. " Tachycardia present.      Pulses: Normal pulses.      Heart sounds: Normal heart sounds.      Comments: S1, S2 audible  Pulmonary:      Effort: Pulmonary effort is normal.      Breath sounds: Normal breath sounds.      Comments: On room air   Abdominal:      General: Abdomen is flat.   Musculoskeletal:         General: Normal range of motion.      Cervical back: Normal range of motion.   Skin:     General: Skin is warm and dry.   Neurological:      General: No focal deficit present.      Mental Status: She is alert and oriented to person, place, and time. Mental status is at baseline.   Psychiatric:         Mood and Affect: Mood normal.         Behavior: Behavior normal.         Thought Content: Thought content normal.         Judgment: Judgment normal.                Physical Exam   Result Review :             Procedures       Assessment and Plan     Diagnoses and all orders for this visit:    1. Tachycardia (Primary)  Assessment & Plan:  Tachycardia- She has heart palpitations when her anxiety is high. Denies CP or SOA. She has not checked her HR at home. She stopped the fanapt.        -122 in office  Increased metoprolol 25mg twice a day   Encourage to cut back on caffeine       2. Anxiety with depression  Assessment & Plan:  Anxiety- She reports she does have bad anxiety. Denies SI or HI. She does not see a counselor.     Continue remeron  Prescribed lexapro   Encourage counseling       Other orders  -     metoprolol tartrate (LOPRESSOR) 25 MG tablet; Take 1 tablet by mouth 2 (Two) Times a Day.  Dispense: 60 tablet; Refill: 0  -     mirtazapine (REMERON) 45 MG tablet; Take 1 tablet by mouth Every Night.  Dispense: 90 tablet; Refill: 3  -     escitalopram (Lexapro) 10 MG tablet; Take 1 tablet by mouth Daily.  Dispense: 90 tablet; Refill: 0              Follow Up   Return in about 1 month (around 8/8/2025) for anxiety, tachycardia , Recheck.   Patient was given instructions and counseling regarding her  condition or for health maintenance advice. Please see specific information pulled into the AVS if appropriate.

## 2025-07-08 ENCOUNTER — OFFICE VISIT (OUTPATIENT)
Dept: FAMILY MEDICINE CLINIC | Facility: CLINIC | Age: 45
End: 2025-07-08
Payer: MEDICAID

## 2025-07-08 VITALS
DIASTOLIC BLOOD PRESSURE: 73 MMHG | WEIGHT: 128 LBS | HEIGHT: 66 IN | TEMPERATURE: 98.4 F | OXYGEN SATURATION: 98 % | BODY MASS INDEX: 20.57 KG/M2 | HEART RATE: 124 BPM | SYSTOLIC BLOOD PRESSURE: 106 MMHG

## 2025-07-08 DIAGNOSIS — R00.0 TACHYCARDIA: Primary | ICD-10-CM

## 2025-07-08 DIAGNOSIS — F41.8 ANXIETY WITH DEPRESSION: ICD-10-CM

## 2025-07-08 PROCEDURE — 1159F MED LIST DOCD IN RCRD: CPT | Performed by: NURSE PRACTITIONER

## 2025-07-08 PROCEDURE — 99214 OFFICE O/P EST MOD 30 MIN: CPT | Performed by: NURSE PRACTITIONER

## 2025-07-08 PROCEDURE — 1160F RVW MEDS BY RX/DR IN RCRD: CPT | Performed by: NURSE PRACTITIONER

## 2025-07-08 PROCEDURE — 1126F AMNT PAIN NOTED NONE PRSNT: CPT | Performed by: NURSE PRACTITIONER

## 2025-07-08 RX ORDER — ESCITALOPRAM OXALATE 10 MG/1
10 TABLET ORAL DAILY
Qty: 90 TABLET | Refills: 0 | Status: SHIPPED | OUTPATIENT
Start: 2025-07-08

## 2025-07-08 RX ORDER — MIRTAZAPINE 45 MG/1
45 TABLET, FILM COATED ORAL NIGHTLY
Qty: 90 TABLET | Refills: 3 | Status: SHIPPED | OUTPATIENT
Start: 2025-07-08

## 2025-07-08 RX ORDER — METOPROLOL TARTRATE 25 MG/1
25 TABLET, FILM COATED ORAL 2 TIMES DAILY
Qty: 60 TABLET | Refills: 0 | Status: SHIPPED | OUTPATIENT
Start: 2025-07-08

## 2025-07-08 NOTE — PATIENT INSTRUCTIONS
Increased metoprolol 25mg twice a day   Encourage to cut back on caffeine   Prescribed lexapro  Encourage counseling

## 2025-07-08 NOTE — ASSESSMENT & PLAN NOTE
Anxiety- She reports she does have bad anxiety. Denies SI or HI. She does not see a counselor.     Continue remeron  Prescribed lexapro   Encourage counseling

## 2025-07-08 NOTE — ASSESSMENT & PLAN NOTE
Tachycardia- She has heart palpitations when her anxiety is high. Denies CP or SOA. She has not checked her HR at home. She stopped the fanapt.        -122 in office  Increased metoprolol 25mg twice a day   Encourage to cut back on caffeine

## 2025-08-11 ENCOUNTER — OFFICE VISIT (OUTPATIENT)
Dept: FAMILY MEDICINE CLINIC | Facility: CLINIC | Age: 45
End: 2025-08-11
Payer: MEDICAID

## 2025-08-11 VITALS
HEART RATE: 77 BPM | TEMPERATURE: 98.4 F | OXYGEN SATURATION: 98 % | HEIGHT: 66 IN | WEIGHT: 125 LBS | SYSTOLIC BLOOD PRESSURE: 114 MMHG | DIASTOLIC BLOOD PRESSURE: 75 MMHG | BODY MASS INDEX: 20.09 KG/M2

## 2025-08-11 DIAGNOSIS — F41.8 ANXIETY WITH DEPRESSION: Primary | ICD-10-CM

## 2025-08-11 DIAGNOSIS — R00.0 TACHYCARDIA: ICD-10-CM

## 2025-08-11 PROCEDURE — 99214 OFFICE O/P EST MOD 30 MIN: CPT | Performed by: NURSE PRACTITIONER

## 2025-08-11 PROCEDURE — 1159F MED LIST DOCD IN RCRD: CPT | Performed by: NURSE PRACTITIONER

## 2025-08-11 PROCEDURE — 1126F AMNT PAIN NOTED NONE PRSNT: CPT | Performed by: NURSE PRACTITIONER

## 2025-08-11 PROCEDURE — 1160F RVW MEDS BY RX/DR IN RCRD: CPT | Performed by: NURSE PRACTITIONER

## 2025-08-11 RX ORDER — MIRTAZAPINE 45 MG/1
45 TABLET, FILM COATED ORAL NIGHTLY
Qty: 90 TABLET | Refills: 3 | Status: SHIPPED | OUTPATIENT
Start: 2025-08-11

## 2025-08-11 RX ORDER — ESCITALOPRAM OXALATE 10 MG/1
10 TABLET ORAL DAILY
Qty: 90 TABLET | Refills: 3 | Status: SHIPPED | OUTPATIENT
Start: 2025-08-11

## 2025-08-11 RX ORDER — METOPROLOL TARTRATE 25 MG/1
25 TABLET, FILM COATED ORAL DAILY
Qty: 90 TABLET | Refills: 3 | Status: SHIPPED | OUTPATIENT
Start: 2025-08-11